# Patient Record
Sex: FEMALE | Race: WHITE | NOT HISPANIC OR LATINO | Employment: UNEMPLOYED | URBAN - METROPOLITAN AREA
[De-identification: names, ages, dates, MRNs, and addresses within clinical notes are randomized per-mention and may not be internally consistent; named-entity substitution may affect disease eponyms.]

---

## 2018-04-10 ENCOUNTER — TELEPHONE (OUTPATIENT)
Dept: ADMINISTRATIVE | Facility: HOSPITAL | Age: 58
End: 2018-04-10

## 2019-10-31 ENCOUNTER — HISTORICAL (OUTPATIENT)
Dept: CARDIOLOGY | Facility: HOSPITAL | Age: 59
End: 2019-10-31

## 2021-07-01 ENCOUNTER — PATIENT MESSAGE (OUTPATIENT)
Dept: ADMINISTRATIVE | Facility: OTHER | Age: 61
End: 2021-07-01

## 2021-09-24 ENCOUNTER — HISTORICAL (OUTPATIENT)
Dept: ADMINISTRATIVE | Facility: HOSPITAL | Age: 61
End: 2021-09-24

## 2021-09-24 LAB
ABS NEUT (OLG): 6.36 X10(3)/MCL (ref 2.1–9.2)
ALBUMIN SERPL-MCNC: 4.5 GM/DL (ref 3.5–5)
ALBUMIN/GLOB SERPL: 1.6 RATIO (ref 1.1–2)
ALP SERPL-CCNC: 85 UNIT/L (ref 40–150)
ALT SERPL-CCNC: 26 UNIT/L (ref 0–55)
AST SERPL-CCNC: 20 UNIT/L (ref 5–34)
BASOPHILS # BLD AUTO: 0.1 X10(3)/MCL (ref 0–0.2)
BASOPHILS NFR BLD AUTO: 1 %
BILIRUB SERPL-MCNC: 0.4 MG/DL
BILIRUBIN DIRECT+TOT PNL SERPL-MCNC: 0.2 MG/DL (ref 0–0.5)
BILIRUBIN DIRECT+TOT PNL SERPL-MCNC: 0.2 MG/DL (ref 0–0.8)
BUN SERPL-MCNC: 13.2 MG/DL (ref 9.8–20.1)
CALCIUM SERPL-MCNC: 10.3 MG/DL (ref 8.4–10.2)
CHLORIDE SERPL-SCNC: 102 MMOL/L (ref 98–107)
CO2 SERPL-SCNC: 28 MMOL/L (ref 22–29)
CREAT SERPL-MCNC: 0.73 MG/DL (ref 0.55–1.02)
EOSINOPHIL # BLD AUTO: 0.3 X10(3)/MCL (ref 0–0.9)
EOSINOPHIL NFR BLD AUTO: 3 %
ERYTHROCYTE [DISTWIDTH] IN BLOOD BY AUTOMATED COUNT: 14.6 % (ref 11.5–14.5)
GLOBULIN SER-MCNC: 2.9 GM/DL (ref 2.4–3.5)
GLUCOSE SERPL-MCNC: 76 MG/DL (ref 74–100)
HCT VFR BLD AUTO: 46.9 % (ref 35–46)
HGB BLD-MCNC: 15.1 GM/DL (ref 12–16)
IMM GRANULOCYTES # BLD AUTO: 0.03 10*3/UL
IMM GRANULOCYTES NFR BLD AUTO: 0 %
LYMPHOCYTES # BLD AUTO: 2.4 X10(3)/MCL (ref 0.6–4.6)
LYMPHOCYTES NFR BLD AUTO: 24 %
MCH RBC QN AUTO: 29 PG (ref 26–34)
MCHC RBC AUTO-ENTMCNC: 32.2 GM/DL (ref 31–37)
MCV RBC AUTO: 90 FL (ref 80–100)
MONOCYTES # BLD AUTO: 0.7 X10(3)/MCL (ref 0.1–1.3)
MONOCYTES NFR BLD AUTO: 7 %
NEUTROPHILS # BLD AUTO: 6.36 X10(3)/MCL (ref 2.1–9.2)
NEUTROPHILS NFR BLD AUTO: 65 %
NRBC BLD AUTO-RTO: 0 % (ref 0–0.2)
PLATELET # BLD AUTO: 1082 X10(3)/MCL (ref 130–400)
PMV BLD AUTO: 9.3 FL (ref 7.4–10.4)
POTASSIUM SERPL-SCNC: 3.9 MMOL/L (ref 3.5–5.1)
PROT SERPL-MCNC: 7.4 GM/DL (ref 6.4–8.3)
RBC # BLD AUTO: 5.21 X10(6)/MCL (ref 4–5.2)
SODIUM SERPL-SCNC: 140 MMOL/L (ref 136–145)
WBC # SPEC AUTO: 9.8 X10(3)/MCL (ref 4.5–11)

## 2021-10-12 ENCOUNTER — HISTORICAL (OUTPATIENT)
Dept: ENDOSCOPY | Facility: HOSPITAL | Age: 61
End: 2021-10-12

## 2021-10-12 LAB
ABS NEUT (OLG): 6.2 X10(3)/MCL (ref 2.1–9.2)
BASOPHILS # BLD AUTO: 0.1 X10(3)/MCL (ref 0–0.2)
BASOPHILS NFR BLD AUTO: 1 %
EOSINOPHIL # BLD AUTO: 0.3 X10(3)/MCL (ref 0–0.9)
EOSINOPHIL NFR BLD AUTO: 3 %
ERYTHROCYTE [DISTWIDTH] IN BLOOD BY AUTOMATED COUNT: 14.7 % (ref 11.5–14.5)
HCT VFR BLD AUTO: 42.1 % (ref 35–46)
HGB BLD-MCNC: 13.9 GM/DL (ref 12–16)
IMM GRANULOCYTES # BLD AUTO: 0.03 10*3/UL
IMM GRANULOCYTES NFR BLD AUTO: 0 %
INR PPP: 0.93 (ref 0.9–1.2)
LYMPHOCYTES # BLD AUTO: 2.2 X10(3)/MCL (ref 0.6–4.6)
LYMPHOCYTES NFR BLD AUTO: 24 %
MCH RBC QN AUTO: 28.9 PG (ref 26–34)
MCHC RBC AUTO-ENTMCNC: 33 GM/DL (ref 31–37)
MCV RBC AUTO: 87.5 FL (ref 80–100)
MONOCYTES # BLD AUTO: 0.6 X10(3)/MCL (ref 0.1–1.3)
MONOCYTES NFR BLD AUTO: 7 %
NEUTROPHILS # BLD AUTO: 6.2 X10(3)/MCL (ref 2.1–9.2)
NEUTROPHILS NFR BLD AUTO: 66 %
NRBC BLD AUTO-RTO: 0 % (ref 0–0.2)
PLATELET # BLD AUTO: 775 X10(3)/MCL (ref 130–400)
PMV BLD AUTO: 8.9 FL (ref 7.4–10.4)
PROTHROMBIN TIME: 12.3 SECOND(S) (ref 11.9–14.4)
RBC # BLD AUTO: 4.81 X10(6)/MCL (ref 4–5.2)
WBC # SPEC AUTO: 9.4 X10(3)/MCL (ref 4.5–11)

## 2021-12-06 ENCOUNTER — PATIENT MESSAGE (OUTPATIENT)
Dept: RESEARCH | Facility: HOSPITAL | Age: 61
End: 2021-12-06
Payer: MEDICAID

## 2022-04-30 NOTE — OP NOTE
DATE OF SURGERY:    10/31/2019    SURGEON:  Esau Nova MD    PROCEDURE:  Bilateral selective carotid angiography.    INDICATION:  Carotid artery stenosis by duplex.    ACCESS:  Right common femoral artery.    PROCEDURE IN DETAIL:  The patient was brought to the cardiac catheterization laboratory after obtaining informed consent.  The right groin was prepped and draped in the usual sterile manner.  Copious amount of 2% Xylocaine was instilled into the right groin, and the right common femoral artery was accessed using a standard Seldinger technique and a 5-Azerbaijani sheath introduced.  Next, using a combination of 5-Azerbaijani JR4 catheter and 0.035-inch stiff Glidewire, the catheter was advanced over the wire into the innominate artery, and the right common carotid artery was selectively cannulated.  Angiogram of the right carotid system revealed patent right common carotid, internal carotid artery, as well as external carotid artery with mild luminal irregularities.     Next, the catheter was advanced over the wire into the left common carotid artery, which was originating from the arch of the aorta, coursing superiorly, bifurcating into the internal and external carotid arteries.  No evidence of any obstructive disease noted except for mild luminal irregularities.    IMPRESSION:  Normal bilateral selective carotid angiography with mild nonobstructive disease.        ______________________________  Esau Nova MD    RRP/UT  DD:  11/10/2019  Time:  03:49PM  DT:  11/10/2019  Time:  04:05PM  Job #:  [id number]

## 2024-04-29 ENCOUNTER — HOSPITAL ENCOUNTER (EMERGENCY)
Facility: HOSPITAL | Age: 64
Discharge: HOME | End: 2024-04-29
Attending: STUDENT IN AN ORGANIZED HEALTH CARE EDUCATION/TRAINING PROGRAM

## 2024-04-29 VITALS
DIASTOLIC BLOOD PRESSURE: 86 MMHG | WEIGHT: 215 LBS | HEART RATE: 66 BPM | TEMPERATURE: 98 F | HEIGHT: 64 IN | SYSTOLIC BLOOD PRESSURE: 148 MMHG | OXYGEN SATURATION: 95 % | RESPIRATION RATE: 18 BRPM | BODY MASS INDEX: 36.7 KG/M2

## 2024-04-29 DIAGNOSIS — M77.9 TENDONITIS: Primary | ICD-10-CM

## 2024-04-29 DIAGNOSIS — N39.0 URINARY TRACT INFECTION WITHOUT HEMATURIA, SITE UNSPECIFIED: ICD-10-CM

## 2024-04-29 DIAGNOSIS — M79.604 RIGHT LEG PAIN: ICD-10-CM

## 2024-04-29 DIAGNOSIS — M79.89 LEG SWELLING: ICD-10-CM

## 2024-04-29 LAB
ALBUMIN SERPL-MCNC: 3.9 G/DL (ref 3.4–4.8)
ALBUMIN/GLOB SERPL: 1.6 RATIO (ref 1.1–2)
ALP SERPL-CCNC: 85 UNIT/L (ref 40–150)
ALT SERPL-CCNC: 19 UNIT/L (ref 0–55)
APPEARANCE UR: CLEAR
AST SERPL-CCNC: 16 UNIT/L (ref 5–34)
BACTERIA #/AREA URNS AUTO: ABNORMAL /HPF
BASOPHILS # BLD AUTO: 0.09 X10(3)/MCL
BASOPHILS NFR BLD AUTO: 0.7 %
BILIRUB SERPL-MCNC: 0.3 MG/DL
BILIRUB UR QL STRIP.AUTO: NEGATIVE
BNP BLD-MCNC: 26.2 PG/ML
BUN SERPL-MCNC: 15.6 MG/DL (ref 9.8–20.1)
CALCIUM SERPL-MCNC: 8.7 MG/DL (ref 8.4–10.2)
CHLORIDE SERPL-SCNC: 109 MMOL/L (ref 98–107)
CO2 SERPL-SCNC: 24 MMOL/L (ref 23–31)
COLOR UR AUTO: ABNORMAL
CREAT SERPL-MCNC: 0.9 MG/DL (ref 0.55–1.02)
EOSINOPHIL # BLD AUTO: 0.38 X10(3)/MCL (ref 0–0.9)
EOSINOPHIL NFR BLD AUTO: 3.2 %
ERYTHROCYTE [DISTWIDTH] IN BLOOD BY AUTOMATED COUNT: 15.2 % (ref 11.5–17)
GFR SERPLBLD CREATININE-BSD FMLA CKD-EPI: >60 MLS/MIN/1.73/M2
GLOBULIN SER-MCNC: 2.4 GM/DL (ref 2.4–3.5)
GLUCOSE SERPL-MCNC: 103 MG/DL (ref 82–115)
GLUCOSE UR QL STRIP.AUTO: NORMAL
HCT VFR BLD AUTO: 43.6 % (ref 37–47)
HGB BLD-MCNC: 14.1 G/DL (ref 12–16)
HYALINE CASTS #/AREA URNS LPF: ABNORMAL /LPF
IMM GRANULOCYTES # BLD AUTO: 0.05 X10(3)/MCL (ref 0–0.04)
IMM GRANULOCYTES NFR BLD AUTO: 0.4 %
KETONES UR QL STRIP.AUTO: NEGATIVE
LEFT ABI: 1.26
LEFT ARM BP: 130 MMHG
LEFT DORSALIS PEDIS: 139 MMHG
LEFT POSTERIOR TIBIAL: 170 MMHG
LEUKOCYTE ESTERASE UR QL STRIP.AUTO: 25
LYMPHOCYTES # BLD AUTO: 2.07 X10(3)/MCL (ref 0.6–4.6)
LYMPHOCYTES NFR BLD AUTO: 17.2 %
MCH RBC QN AUTO: 28.7 PG (ref 27–31)
MCHC RBC AUTO-ENTMCNC: 32.3 G/DL (ref 33–36)
MCV RBC AUTO: 88.6 FL (ref 80–94)
MONOCYTES # BLD AUTO: 0.85 X10(3)/MCL (ref 0.1–1.3)
MONOCYTES NFR BLD AUTO: 7 %
MUCOUS THREADS URNS QL MICRO: ABNORMAL /LPF
NEUTROPHILS # BLD AUTO: 8.62 X10(3)/MCL (ref 2.1–9.2)
NEUTROPHILS NFR BLD AUTO: 71.5 %
NITRITE UR QL STRIP.AUTO: ABNORMAL
NRBC BLD AUTO-RTO: 0 %
PH UR STRIP.AUTO: 5.5 [PH]
PLATELET # BLD AUTO: 1173 X10(3)/MCL (ref 130–400)
PLATELET # BLD EST: ABNORMAL 10*3/UL
PLATELETS.RETICULATED NFR BLD AUTO: 3.1 % (ref 0.9–11.2)
PMV BLD AUTO: 9.4 FL (ref 7.4–10.4)
POTASSIUM SERPL-SCNC: 4.6 MMOL/L (ref 3.5–5.1)
PROT SERPL-MCNC: 6.3 GM/DL (ref 5.8–7.6)
PROT UR QL STRIP.AUTO: NEGATIVE
RBC # BLD AUTO: 4.92 X10(6)/MCL (ref 4.2–5.4)
RBC #/AREA URNS AUTO: ABNORMAL /HPF
RBC MORPH BLD: NORMAL
RBC UR QL AUTO: NEGATIVE
RIGHT ABI: 1.32
RIGHT ARM BP: 135 MMHG
RIGHT DORSALIS PEDIS: 165 MMHG
RIGHT POSTERIOR TIBIAL: 178 MMHG
SODIUM SERPL-SCNC: 139 MMOL/L (ref 136–145)
SP GR UR STRIP.AUTO: 1.02 (ref 1–1.03)
SQUAMOUS #/AREA URNS LPF: ABNORMAL /HPF
UROBILINOGEN UR STRIP-ACNC: NORMAL
WBC # SPEC AUTO: 12.06 X10(3)/MCL (ref 4.5–11.5)
WBC #/AREA URNS AUTO: ABNORMAL /HPF

## 2024-04-29 PROCEDURE — 80053 COMPREHEN METABOLIC PANEL: CPT | Performed by: STUDENT IN AN ORGANIZED HEALTH CARE EDUCATION/TRAINING PROGRAM

## 2024-04-29 PROCEDURE — 81001 URINALYSIS AUTO W/SCOPE: CPT | Performed by: STUDENT IN AN ORGANIZED HEALTH CARE EDUCATION/TRAINING PROGRAM

## 2024-04-29 PROCEDURE — 99284 EMERGENCY DEPT VISIT MOD MDM: CPT | Mod: 25

## 2024-04-29 PROCEDURE — 83880 ASSAY OF NATRIURETIC PEPTIDE: CPT | Performed by: STUDENT IN AN ORGANIZED HEALTH CARE EDUCATION/TRAINING PROGRAM

## 2024-04-29 PROCEDURE — 85025 COMPLETE CBC W/AUTO DIFF WBC: CPT | Performed by: STUDENT IN AN ORGANIZED HEALTH CARE EDUCATION/TRAINING PROGRAM

## 2024-04-29 RX ORDER — IBUPROFEN 600 MG/1
600 TABLET ORAL EVERY 6 HOURS PRN
Qty: 20 TABLET | Refills: 0 | Status: SHIPPED | OUTPATIENT
Start: 2024-04-29

## 2024-04-29 RX ORDER — LIDOCAINE 50 MG/G
1 PATCH TOPICAL DAILY
Qty: 7 PATCH | Refills: 0 | Status: SHIPPED | OUTPATIENT
Start: 2024-04-29 | End: 2024-05-06

## 2024-04-29 RX ORDER — CYCLOBENZAPRINE HCL 10 MG
10 TABLET ORAL 3 TIMES DAILY PRN
Qty: 15 TABLET | Refills: 0 | Status: SHIPPED | OUTPATIENT
Start: 2024-04-29 | End: 2024-05-04

## 2024-04-29 RX ORDER — CEFDINIR 300 MG/1
300 CAPSULE ORAL 2 TIMES DAILY
Qty: 14 CAPSULE | Refills: 0 | Status: SHIPPED | OUTPATIENT
Start: 2024-04-29 | End: 2024-05-06

## 2024-04-29 RX ORDER — BUPROPION HYDROCHLORIDE 300 MG/1
300 TABLET ORAL EVERY MORNING
COMMUNITY
Start: 2024-04-25

## 2024-04-29 RX ORDER — FOLIC ACID 1 MG/1
1000 TABLET ORAL DAILY
COMMUNITY
Start: 2024-04-25

## 2024-04-29 RX ORDER — CLOPIDOGREL BISULFATE 75 MG/1
1 TABLET ORAL DAILY
COMMUNITY

## 2024-04-29 RX ORDER — AMLODIPINE AND BENAZEPRIL HYDROCHLORIDE 10; 40 MG/1; MG/1
1 CAPSULE ORAL DAILY
COMMUNITY

## 2024-04-29 RX ORDER — MELOXICAM 15 MG/1
15 TABLET ORAL 3 TIMES DAILY
COMMUNITY
Start: 2024-04-25

## 2024-04-29 NOTE — ED PROVIDER NOTES
Encounter Date: 2024    SCRIBE #1 NOTE: I, Cris Manfred, am scribing for, and in the presence of,  Heri Hawikns MD. I have scribed the following portions of the note - Other sections scribed: HPI, ROS, PE.       History     Chief Complaint   Patient presents with    Leg Swelling     C/O R leg swelling from thighs to toe worsening since yesterday but began 1 month ago. +pain. Denies fall/injury. Hx stents in legs     Patient is a 63 year old female with a history of COPD, CVA, and HTN that presents to the ED for R leg swelling for one month. Patient also reports R foot pain yesterday. She also complains of intermittent R thigh myalgias and coldness of her R foot. She denies trauma and falls.     The history is provided by the patient and medical records. No  was used.     Review of patient's allergies indicates:   Allergen Reactions    Hydralazine analogues Other (See Comments)     pt stated that her face drooped when she took the medication in her last hospital stay    Tramadol Other (See Comments)     hallucinations     Past Medical History:   Diagnosis Date    Anemia, unspecified     Anxiety state, unspecified     Chronic airway obstruction, not elsewhere classified     Degeneration of lumbar or lumbosacral intervertebral disc     Hypertension     Obesity, unspecified     Other emphysema      Past Surgical History:   Procedure Laterality Date     SECTION      HYSTERECTOMY      TUBAL LIGATION       Family History   Problem Relation Name Age of Onset    Diabetes Mother      Hypertension Mother      Hypertension Father      Diabetes Father      No Known Problems Sister       Social History     Tobacco Use    Smoking status: Every Day     Current packs/day: 0.25     Types: Cigarettes    Tobacco comments:     pt try to by self first   Substance Use Topics    Alcohol use: No     Alcohol/week: 0.0 standard drinks of alcohol    Drug use: No     Review of Systems   Constitutional:   Negative for chills and fever.   HENT:  Negative for congestion, rhinorrhea and sore throat.    Eyes:  Negative for visual disturbance.   Respiratory:  Negative for cough and shortness of breath.    Cardiovascular:  Positive for leg swelling. Negative for chest pain.   Gastrointestinal:  Negative for abdominal pain, nausea and vomiting.   Genitourinary:  Negative for dysuria, hematuria, vaginal bleeding and vaginal discharge.   Musculoskeletal:  Positive for myalgias. Negative for joint swelling.        R foot pain.   Skin:  Negative for rash.        Cold R foot.    Neurological:  Negative for weakness.   Psychiatric/Behavioral:  Negative for confusion.        Physical Exam     Initial Vitals [04/29/24 1305]   BP Pulse Resp Temp SpO2   (!) 161/84 65 18 98.2 °F (36.8 °C) 97 %      MAP       --         Physical Exam    Nursing note and vitals reviewed.  Constitutional: She is not diaphoretic. No distress.   HENT:   Head: Normocephalic and atraumatic.   Neck: Neck supple.   Normal range of motion.  Cardiovascular:  Normal rate and regular rhythm.           No murmur heard.  Pulses:       Dorsalis pedis pulses are 2+ on the right side and 2+ on the left side.        Posterior tibial pulses are 2+ on the right side and 2+ on the left side.   Pulmonary/Chest: Breath sounds normal. No respiratory distress.   Abdominal: Abdomen is soft. She exhibits no distension. There is no abdominal tenderness.   Musculoskeletal:         General: No edema.      Cervical back: Normal range of motion and neck supple.      Comments: R medial thigh tenderness, no swelling, leg wwp, 2+ distal DP and PT pulses     Neurological: She is alert and oriented to person, place, and time. She has normal strength. No cranial nerve deficit or sensory deficit.   Skin: Skin is warm. Capillary refill takes less than 2 seconds. No erythema.   Warm. Well-perfused.    Psychiatric: She has a normal mood and affect.         ED Course   Procedures  Labs Reviewed    COMPREHENSIVE METABOLIC PANEL - Abnormal; Notable for the following components:       Result Value    Chloride 109 (*)     All other components within normal limits   URINALYSIS, REFLEX TO URINE CULTURE - Abnormal; Notable for the following components:    Nitrites, UA 2+ (*)     Leukocyte Esterase, UA 25 (*)     WBC, UA 6-10 (*)     Bacteria, UA Many (*)     Mucous, UA Trace (*)     Hyaline Casts, UA 0-2 (*)     All other components within normal limits   CBC WITH DIFFERENTIAL - Abnormal; Notable for the following components:    WBC 12.06 (*)     MCHC 32.3 (*)     Platelet 1,173 (*)     IG# 0.05 (*)     All other components within normal limits   BLOOD SMEAR MICROSCOPIC EXAM ([facility]) - Abnormal; Notable for the following components:    Platelets Increased (*)     All other components within normal limits   B-TYPE NATRIURETIC PEPTIDE - Normal   CBC W/ AUTO DIFFERENTIAL    Narrative:     The following orders were created for panel order CBC auto differential.  Procedure                               Abnormality         Status                     ---------                               -----------         ------                     CBC with Differential[[id number]]       Abnormal            Final result                 Please view results for these tests on the individual orders.   PATH REVIEW OF BLOOD SMEAR          Imaging Results              X-Ray Chest AP Portable (Final result)  Result time 04/29/24 16:46:49      Final result by Jeffry Perez MD (04/29/24 16:46:49)                   Impression:      NO ACUTE CARDIOPULMONARY PROCESS IDENTIFIED.      Electronically signed by: Jeffry Perez  Date:    04/29/2024  Time:    16:46               Narrative:    EXAMINATION:  XR CHEST AP PORTABLE    CLINICAL HISTORY:  Other specified soft tissue disorders    TECHNIQUE:  One    COMPARISON:  August 23, 2023.    FINDINGS:  Cardiopericardial silhouette is within normal limits. Lungs are without dense focal or segmental  consolidation, congestive process, pleural effusions or pneumothorax.                                       Medications - No data to display  Medical Decision Making  63-year-old presenting with subjective right leg swelling spasms versus cramps in her proximal thigh  Does have a history of thrombocytosis follows with hematology on aspirin  Workup of her right leg pain is negative negative ultrasound for DVT no significant peripheral arterial disease on ultrasound  Labs unremarkable outside of UTI - will treat   She is tender to her right medial thigh point tender likely musculoskeletal tendinitis  Will treat outpatient patient platelets are elevated near baseline she reports that she will follow up with her hematologist she is asymptomatic from this standpoint return precautions given    ED management - DVT/PAD r/o, rx management     Differential diagnosis includes, but is not limited to:  DVT, PAD, electrolyte derangement, msk pain    Problems Addressed:  Leg swelling: acute illness or injury that poses a threat to life or bodily functions  Right leg pain: acute illness or injury that poses a threat to life or bodily functions  Tendonitis: acute illness or injury that poses a threat to life or bodily functions    Amount and/or Complexity of Data Reviewed  Labs: ordered.  Radiology: ordered and independent interpretation performed.    Risk  OTC drugs.  Prescription drug management.            Scribe Attestation:   Scribe #1: I performed the above scribed service and the documentation accurately describes the services I performed. I attest to the accuracy of the note.    Attending Attestation:           Physician Attestation for Scribe:  Physician Attestation Statement for Scribe #1: I, Heri Hawkins MD, reviewed documentation, as scribed by Cris Shearer in my presence, and it is both accurate and complete.             ED Course as of 04/29/24 2256   Mon Apr 29, 2024   1838 Patient has a chronic thrombocytosis she  reports she  follow up for this issue and is on ASA daily - otherwise her workup is negative she is very tender over her medial thigh at tendon - symptoms likely tendonitis. Will treat with antiflammatories. Also has  uti - will treat. Patient amendable to plan   [AC]      ED Course User Index  [AC] Heri Hawkins IV, MD                             Clinical Impression:  Final diagnoses:  [M79.604] Right leg pain  [M79.89] Leg swelling  [M77.9] Tendonitis (Primary)  [N39.0] Urinary tract infection without hematuria, site unspecified          ED Disposition Condition    Discharge           ED Prescriptions       Medication Sig Dispense Start Date End Date Auth. Provider    ibuprofen (ADVIL,MOTRIN) 600 MG tablet Take 1 tablet (600 mg total) by mouth every 6 (six) hours as needed for Pain. 20 tablet 4/29/2024 -- Heri Hawkins IV, MD    cyclobenzaprine (FLEXERIL) 10 MG tablet Take 1 tablet (10 mg total) by mouth 3 (three) times daily as needed for Muscle spasms. 15 tablet 4/29/2024 5/4/2024 Heri Hawkins IV, MD    LIDOcaine (LIDODERM) 5 % Place 1 patch onto the skin once daily. Remove & Discard patch within 12 hours or as directed by MD for 7 days 7 patch 4/29/2024 5/6/2024 Heri Hawkins IV, MD    cefdinir (OMNICEF) 300 MG capsule Take 1 capsule (300 mg total) by mouth 2 (two) times daily. for 7 days 14 capsule 4/29/2024 5/6/2024 Heri Hawkins IV, MD          Follow-up Information       Follow up With Specialties Details Why Contact Info    [facility] Emergency Medicine Go to  If symptoms worsen [address] [facility] [place] [id number] [phone]    Primary care physician  Schedule an appointment as soon as possible for a visit   Follow up with you primary care physician.   If you do not have a primary care physician call [phone] to schedule an appointment.             Heri Hawkins IV, MD  04/29/24 1581

## 2024-04-29 NOTE — DISCHARGE INSTRUCTIONS
Thanks for letting use take care of you today! It is our goal to give you courteous care and to keep you comfortable and informed. If you have any questions before you leave I will be happy to try and answer them.     Advice after your visit:  Your visit in the emergency department is NOT definitive care - please follow-up with your primary care doctor and/or specialist within 1-2 days. If you do not have a primary care physician call [phone] to schedule an appointment. Please return if you have any worsening in your condition or if you have any other concerns.    Return to the emergency department if any worsening symptoms including fever, chest pain, difficulty breathing, weakness, numbness, tingling, nausea, vomiting, inability to eat, drink or take your medication, or any other new symptoms or concerns arise.      Please signup for MyChart as noted below in your paperwork to review all labwork, imaging results, and any other incidental findings from today's visit.     If you had radiology exams like an XRAY or CT in the emergency Department the interpreation on them may be preliminary - there may be less time sensitive findings on the reports please obtain these reports within 24 hours from the hospital or by using your out on your mobile phone to access records.  Bring these to your primary care doctor and/or specialist for further review of incidental findings.    Please review any LAB WORK from your visit today with your primary care physician.    If you were prescribed OPIATE PAIN MEDICATION - please understand of these medications can be addictive, you may fill less of the prescription was written for, you do not have to take the full prescription.  You may discard what you do not use.  Please seek help if you feel you are having problems with addiction.  Do not drive or operate heavy machinery if you are taking sedating medications.  Do not mix these medications with alcohol.      If you had a SPLINT  placed in the emergency department if you have severe pain numbness tingling or discoloration of year digits please remove the splint and return to the emergency department for further evaluation as this may represent a sign of compromise to the nerves or blood vessels due to swelling.    If you had SUTURES in the emergency department please have them removed in the prescribed time frame typically within 7-14 days.  You may shower but please do not bathe or swim.  Keep the wounds clean and dry and covered with a clean dressing.  Please return if he have any signs of infection like redness or drainage or pain at the suture site.    Please take the full course of  any ANTIBIOTICS you were prescribed - incomplete courses of antibiotics can cause resistance to antibiotics in the future which will make it difficult to treat any infections you may have.

## 2024-04-29 NOTE — FIRST PROVIDER EVALUATION
Medical screening examination initiated.  I have conducted a focused provider triage encounter, findings are as follows:    Chief Complaint   Patient presents with    Leg Swelling     C/O R leg swelling from thighs to toe worsening since yesterday but began 1 month ago. +pain. Denies fall/injury. Hx stents in legs     Brief history of present illness:  63 y.o. female presents to the ED with RLE swelling that began a month ago with worsening yesterday. Has stents to BLE. On blood thinners.     There were no vitals filed for this visit.    Pertinent physical exam:  Awake, alert, ambulatory with limp, non-labored respirations    Brief workup plan:  US (venous and arterial)    Preliminary workup initiated; this workup will be continued and followed by the physician or advanced practice provider that is assigned to the patient when roomed.

## 2024-04-29 NOTE — ED NOTES
Dilcia Carranza. Pt. Alert and oriented at this time. States that she has been having increased swelling in right leg that has been worsening. Pt states that she is feeling more fatigue with exertion than normal. Vitals assessed. Pt does not appear to be in any immediate distress at this time.

## 2024-04-30 LAB — HEMATOLOGIST REVIEW: NORMAL

## 2025-07-16 ENCOUNTER — HOSPITAL ENCOUNTER (EMERGENCY)
Facility: HOSPITAL | Age: 65
Discharge: HOME | End: 2025-07-16
Attending: EMERGENCY MEDICINE

## 2025-07-16 VITALS
SYSTOLIC BLOOD PRESSURE: 145 MMHG | DIASTOLIC BLOOD PRESSURE: 86 MMHG | HEIGHT: 66 IN | TEMPERATURE: 98 F | BODY MASS INDEX: 33.43 KG/M2 | RESPIRATION RATE: 19 BRPM | OXYGEN SATURATION: 97 % | WEIGHT: 208 LBS | HEART RATE: 65 BPM

## 2025-07-16 DIAGNOSIS — S82.402A CLOSED FRACTURE OF LEFT FIBULA: ICD-10-CM

## 2025-07-16 DIAGNOSIS — M54.32 SCIATICA OF LEFT SIDE: Primary | ICD-10-CM

## 2025-07-16 PROCEDURE — 99284 EMERGENCY DEPT VISIT MOD MDM: CPT | Mod: 25

## 2025-07-16 RX ORDER — CYCLOBENZAPRINE HCL 10 MG
10 TABLET ORAL 3 TIMES DAILY PRN
Qty: 15 TABLET | Refills: 0 | Status: SHIPPED | OUTPATIENT
Start: 2025-07-16 | End: 2025-07-21

## 2025-07-16 RX ORDER — KETOROLAC TROMETHAMINE 30 MG/ML
30 INJECTION, SOLUTION INTRAMUSCULAR; INTRAVENOUS
Status: DISCONTINUED | OUTPATIENT
Start: 2025-07-16 | End: 2025-07-16 | Stop reason: HOSPADM

## 2025-07-16 RX ORDER — DEXAMETHASONE SODIUM PHOSPHATE 4 MG/ML
8 INJECTION, SOLUTION INTRA-ARTICULAR; INTRALESIONAL; INTRAMUSCULAR; INTRAVENOUS; SOFT TISSUE
Status: DISCONTINUED | OUTPATIENT
Start: 2025-07-16 | End: 2025-07-16 | Stop reason: HOSPADM

## 2025-07-16 NOTE — ED PROVIDER NOTES
Encounter Date: 2025       History     Chief Complaint   Patient presents with    Back Pain     Pt reports to ED POV endorsing L lower back pain radiating down LLE since fall 3 months ago. Told hs sciatica.  Ambulatory with slow, steady gait.      See MDM    The history is provided by the patient. No  was used.     Review of patient's allergies indicates:   Allergen Reactions    Hydralazine analogues Other (See Comments)     pt stated that her face drooped when she took the medication in her last hospital stay    Tramadol Other (See Comments)     hallucinations     Past Medical History:   Diagnosis Date    Anemia, unspecified     Anxiety state, unspecified     Chronic airway obstruction, not elsewhere classified     Degeneration of lumbar or lumbosacral intervertebral disc     Hypertension     Obesity, unspecified     Other emphysema      Past Surgical History:   Procedure Laterality Date     SECTION      HYSTERECTOMY      TUBAL LIGATION       Family History   Problem Relation Name Age of Onset    Diabetes Mother      Hypertension Mother      Hypertension Father      Diabetes Father      No Known Problems Sister       Social History[1]  Review of Systems   Constitutional:  Negative for fever.   Respiratory:  Negative for cough and shortness of breath.    Cardiovascular:  Negative for chest pain.   Gastrointestinal:  Negative for abdominal pain.   Genitourinary:  Negative for difficulty urinating and dysuria.   Musculoskeletal:  Positive for back pain. Negative for gait problem.   Skin:  Negative for color change.   Neurological:  Negative for dizziness, speech difficulty and headaches.   Psychiatric/Behavioral:  Negative for hallucinations and suicidal ideas.    All other systems reviewed and are negative.      Physical Exam     Initial Vitals [25 1302]   BP Pulse Resp Temp SpO2   136/65 (!) 58 (!) 24 98.1 °F (36.7 °C) 96 %      MAP       --         Physical Exam    Nursing  note and vitals reviewed.  Constitutional: She appears well-developed and well-nourished.   HENT:   Head: Normocephalic.   Eyes: EOM are normal.   Neck:   Normal range of motion.  Cardiovascular:  Normal rate, regular rhythm, normal heart sounds and intact distal pulses.           Pulmonary/Chest: Breath sounds normal. No respiratory distress.   Abdominal: Abdomen is soft. Bowel sounds are normal. There is no abdominal tenderness.   Musculoskeletal:         General: Normal range of motion.      Cervical back: Normal range of motion.      Comments: Mild tenderness to left lower back.  No bowel or bladder incontinence.  No saddle paresthesia.  No neurological deficits.     Neurological: She is alert and oriented to person, place, and time. She has normal strength.   Skin: Skin is warm and dry.   Psychiatric: She has a normal mood and affect. Her behavior is normal. Judgment and thought content normal.         ED Course   Procedures  Labs Reviewed - No data to display       Imaging Results              X-Ray Tibia Fibula 2 View Left (In process)  Result time 07/16/25 15:40:25   Procedure changed from X-Ray Ankle Complete Left                    CT Lumbar Spine Without Contrast (Final result)  Result time 07/16/25 14:53:57      Final result by Denita Kovacs MD (07/16/25 14:53:57)                   Impression:      1. No acute abnormality identified.  2. Multilevel degenerative changes of the lumbar spine.      Electronically signed by: Denita Kovacs  Date:    07/16/2025  Time:    14:53               Narrative:    EXAMINATION:  CT LUMBAR SPINE WITHOUT CONTRAST    CLINICAL HISTORY:  Low back pain, symptoms persist with > 6wks conservative treatment;    TECHNIQUE:  Noncontrast CT images of the lumbar spine. Axial, coronal, and sagittal reformatted images were obtained. Dose length product is 929 mGycm. Automatic exposure control, adjustment of mA/kV or iterative reconstruction technique was used to limit  radiation dose.    COMPARISON:  MRI lumbar spine dated 11/07/2012    FINDINGS:  There are 5 non-rib-bearing lumbar type vertebral bodies.  Alignment is normal.  The vertebral body heights are maintained.  There is mild disc height loss at L4-5 with small marginal osteophytes.  There is likely severe canal narrowing at L4-5, moderate at L2-L4 with disc bulges and facet hypertrophy.  There is mild neural foraminal narrowing bilaterally at L3 through S1.  There is no paraspinal hematoma.                                       Medications   dexAMETHasone injection 8 mg (has no administration in time range)   ketorolac injection 30 mg (has no administration in time range)     Medical Decision Making  Historian:  Patient.  Patient is a White 64 y.o. female that presents with fall that has been present 3 months ago. Associated symptoms pain radiating down left leg. Surrounding information is patient was seen at another facility and diagnosed with a distal fibula fracture.  She was seen at another visit diagnosed with sciatica.  She was put in a walking boot which she is not wearing. Exacerbated by certain movements. Relieved by nothing. Patient treatment prior to arrival walking boot and medication. Risk factors include none. Other history pertaining to this complaint nothing.   Assessment:  See physical exam.  DD:  Fibula fracture, sciatica, piriformis syndrome, sacroiliac syndrome  ED Course: History was obtained.  Physical was performed.  We will get a x-ray of her left ankle.  X-ray does show a nondisplaced distal fibula fracture.  This appears to be partially healing.  Recommend she wears her as recommended by the previous hospital.  She will need to follow up with Orthopedics.  Recommend patient follow up with her PCP for physical therapy and/or MRI evaluation.  Medical or surgical consults:  None. Social determinants that affect healthcare:  None.       Amount and/or Complexity of Data Reviewed  External Data  Reviewed: radiology and notes.     Details: Reviewed outside ER note and radiologic studies for fall 05/25/2025.  Reviewed outside ER notes for headache 03/19/2025.  Radiology: ordered.     Details: CT does show some possible severe canal narrowing at L4-L5.  This was seen on MRI from 11/07/2012.  The radiologist states there was no acute findings.  There are degenerative changes.  I did discuss this with Dr. Watkins, emergency room physician, and he agrees with me that an MRI is not indicated at this present time.  Tib-fib fracture shows a distal fibula fracture nondisplaced.    Risk  Prescription drug management.  Risk Details: Flexeril                                          Clinical Impression:  Final diagnoses:  [S82.402A] Closed fracture of left fibula  [M54.32] Sciatica of left side (Primary)          ED Disposition Condition    Discharge Stable          ED Prescriptions       Medication Sig Dispense Start Date End Date Auth. Provider    cyclobenzaprine (FLEXERIL) 10 MG tablet Take 1 tablet (10 mg total) by mouth 3 (three) times daily as needed for Muscle spasms. 15 tablet 7/16/2025 7/21/2025 Braden Box FNP          Follow-up Information       Follow up With Specialties Details Why Contact Info    Your Primary Care Provider  Call in 3 days ed follow up                    [1]   Social History  Tobacco Use    Smoking status: Every Day     Current packs/day: 0.25     Types: Cigarettes    Tobacco comments:     pt try to by self first   Vaping Use    Vaping status: Never Used   Substance Use Topics    Alcohol use: No     Alcohol/week: 0.0 standard drinks of alcohol    Drug use: No        Braden Box FNP  07/16/25 1600

## 2025-07-16 NOTE — FIRST PROVIDER EVALUATION
"Medical screening examination initiated.  I have conducted a focused provider triage encounter, findings are as follows:    Brief history of present illness:  64-year-old female presents to ED for evaluation of buttock pain that radiates down into her leg worsening over the last several months after a fall.  Patient reports that she saw PCP and sent here for further evaluation of sciatica    Vitals:    07/16/25 1302   BP: 136/65   Pulse: (!) 58   Resp: (!) 24   Temp: 98.1 °F (36.7 °C)   TempSrc: Oral   SpO2: 96%   Weight: 94.3 kg (208 lb)   Height: 5' 6" (1.676 m)       Pertinent physical exam:  Patient is awake and alert and oriented.  Ambulatory to triage.  In no acute distress.      Brief workup plan:  imaging, meds     Preliminary workup initiated; this workup will be continued and followed by the physician or advanced practice provider that is assigned to the patient when roomed.  "